# Patient Record
Sex: MALE | Race: WHITE | NOT HISPANIC OR LATINO | URBAN - NONMETROPOLITAN AREA
[De-identification: names, ages, dates, MRNs, and addresses within clinical notes are randomized per-mention and may not be internally consistent; named-entity substitution may affect disease eponyms.]

---

## 2018-01-30 PROBLEM — H18.422: Noted: 2018-01-30

## 2018-01-30 PROBLEM — E11.3393: Noted: 2018-01-30

## 2018-01-30 PROBLEM — Z96.1: Noted: 2018-01-30

## 2018-01-30 PROBLEM — H40.1132: Noted: 2018-01-30

## 2018-01-30 PROBLEM — H44.412: Noted: 2018-01-30

## 2018-01-30 PROBLEM — H16.223: Noted: 2018-01-30

## 2019-03-28 ENCOUNTER — IMPORTED ENCOUNTER (OUTPATIENT)
Dept: URBAN - NONMETROPOLITAN AREA CLINIC 1 | Facility: CLINIC | Age: 69
End: 2019-03-28

## 2019-03-28 PROCEDURE — 92014 COMPRE OPH EXAM EST PT 1/>: CPT

## 2019-03-28 PROCEDURE — 92133 CPTRZD OPH DX IMG PST SGM ON: CPT

## 2019-03-28 NOTE — PATIENT DISCUSSION
ENRIKE OD-  Discussed findings in detail w/ pt today-  Signs/symptoms associated discussed including blurry vision. -  Continue Systane Balance OD BID -  Continue to monitor POAG w/ hx of narrow angles:-  Educated patient on condition today. -  Patent PI noted today. -  OCT done today shows No NFL thinning OD and OS UTO -  Optos done previously stable findings OU. -  Gonio done previously grade IV with light pigment OU. -  VF done previously OD unreliable with superior and inferior arcuate with central scotoma. -  Steroid reponder previously. -  Patient had iStent placed at time of cataract surgery. -  IOP stable today at 15 OD.-  PACHY done previously showed -  Continue to monitor  Type II DM dx 25+ years ago:  -  Educated patient on condition today. -  Patient is followed by Dr. Loida Feng as well but has since been released from his care for injections  -  No DME seen on exam today doing much better. -  Mild BDR seen on last visit but stable from previous. -  Explained importance of good blood sugar control and how it can affect the vision. Patient continues to complain about GLRx every visit. -  Reminded again of importance of good blood sugar control. -  Continue to monitor closely. Dermatochalasis OU Ptosis OD-  Discussed findings in detail w/ pt today-  Signs/symptoms associated discussed-  I feel this may be the cause of some of his VA decrease. When we lift the eyelid up in the office today he states he definitely sees better. -  Will continue to monitor for now. P/C IOL OD:-  Educated patient on condition today. -  Appears stable and doing well. -  Continue to monitor. Band Keratopathy OS -  Discussed diagnosis in detail with patient-  Patient is stable at this time-  Continue to monitorHx of Keratitis OD- Discussed diagnosis in detail with patient- No findings on exam today- Continue to monitorHx of Hypotony OS-  discussed finding w/patient-  signs/symptoms associated discussed-  stable on exam today.  -  montor PRNHyperopia/astig/presby-  discussed refractive status-  monitor PRN; 's Notes: RM  6/13/2018Optos 6/13/2018OCT disc 3/28/19OCT mac 1/30/18VF  1/30/17Gonio  6/14/18PACH  10/20/15 Adam Reyes

## 2019-06-27 ENCOUNTER — IMPORTED ENCOUNTER (OUTPATIENT)
Dept: URBAN - NONMETROPOLITAN AREA CLINIC 1 | Facility: CLINIC | Age: 69
End: 2019-06-27

## 2019-06-27 PROBLEM — H18.422: Noted: 2019-06-27

## 2019-06-27 PROBLEM — H40.1132: Noted: 2019-06-27

## 2019-06-27 PROBLEM — E11.3393: Noted: 2019-06-27

## 2019-06-27 PROBLEM — Z96.1: Noted: 2019-06-27

## 2019-06-27 PROBLEM — H44.412: Noted: 2019-06-27

## 2019-06-27 PROBLEM — H16.223: Noted: 2019-06-27

## 2019-06-27 PROBLEM — H10.11: Noted: 2019-06-27

## 2019-06-27 PROCEDURE — 99213 OFFICE O/P EST LOW 20 MIN: CPT

## 2019-06-27 NOTE — PATIENT DISCUSSION
Conjunctivitis OD - Discussed diagnosis in detail with patient - D/C Polymyxin that was Rx'ed by Urgent Care- Start Alrex TID OD samples given today - Continue to monitor - RTC 2 weeks F/U POAG with VF and Complete -------------------------previous notes---------------------------ENRIKE OD-  Discussed findings in detail w/ pt today-  Signs/symptoms associated discussed including blurry vision. -  Continue Systane Balance OD BID -  Continue to monitor POAG w/ hx of narrow angles:-  Educated patient on condition today. -  Patent PI noted today. -  OCT done today shows No NFL thinning OD and OS UTO -  Optos done previously stable findings OU. -  Gonio done previously grade IV with light pigment OU. -  VF done previously OD unreliable with superior and inferior arcuate with central scotoma. -  Steroid reponder previously. -  Patient had iStent placed at time of cataract surgery. -  IOP stable today at 15 OD.-  PACHY done previously showed -  Continue to monitor  Type II DM dx 25+ years ago:  -  Educated patient on condition today. -  Patient is followed by Dr. Hoda Lazcano as well but has since been released from his care for injections  -  No DME seen on exam today doing much better. -  Mild BDR seen on last visit but stable from previous. -  Explained importance of good blood sugar control and how it can affect the vision. Patient continues to complain about GLRx every visit. -  Reminded again of importance of good blood sugar control. -  Continue to monitor closely. Dermatochalasis OU Ptosis OD-  Discussed findings in detail w/ pt today-  Signs/symptoms associated discussed-  I feel this may be the cause of some of his VA decrease. When we lift the eyelid up in the office today he states he definitely sees better. -  Will continue to monitor for now. P/C IOL OD:-  Educated patient on condition today. -  Appears stable and doing well. -  Continue to monitor. Band Keratopathy OS -  Discussed diagnosis in detail with patient-  Patient is stable at this time-  Continue to monitorHx of Keratitis OD- Discussed diagnosis in detail with patient- No findings on exam today- Continue to monitorHx of Hypotony OS-  discussed finding w/patient-  signs/symptoms associated discussed-  stable on exam today.  -  montor PRNHyperopia/astig/presby-  discussed refractive status-  monitor PRN; 's Notes: RM  6/13/2018Optos 6/13/2018OCT disc 3/28/19OCT mac 1/30/18VF  1/30/17Gonio  6/14/18PACH  10/20/15 Jeet Horan

## 2019-07-15 ENCOUNTER — IMPORTED ENCOUNTER (OUTPATIENT)
Dept: URBAN - NONMETROPOLITAN AREA CLINIC 1 | Facility: CLINIC | Age: 69
End: 2019-07-15

## 2019-07-15 PROBLEM — H18.422: Noted: 2019-07-15

## 2019-07-15 PROBLEM — E11.3393: Noted: 2019-07-15

## 2019-07-15 PROBLEM — H44.412: Noted: 2019-07-15

## 2019-07-15 PROBLEM — H40.1132: Noted: 2019-07-15

## 2019-07-15 PROBLEM — H16.223: Noted: 2019-07-15

## 2019-07-15 PROBLEM — Z96.1: Noted: 2019-07-15

## 2019-07-15 PROCEDURE — 92083 EXTENDED VISUAL FIELD XM: CPT

## 2019-07-15 PROCEDURE — 92014 COMPRE OPH EXAM EST PT 1/>: CPT

## 2019-07-15 PROCEDURE — 92015 DETERMINE REFRACTIVE STATE: CPT

## 2019-07-15 NOTE — PATIENT DISCUSSION
Conjunctivitis OD - Discussed diagnosis in detail with patient - D/C Polymyxin that was Rx'ed by Urgent Care- Continue Alrex BID OD until gone  - Continue to monitor  POAG w/ hx of narrow angles:-  Discussed diagnosis in detail with patient  -  Patient had iStent placed at time of cataract surgery. -  Patent PI noted today. -  Steroid reponder previously. -  OCT done previously shows No NFL thinning OD and OS UTO -  Optos done previously stable findings OU. -  Gonio done previously grade IV with light pigment OU. -  PACHY done previously showed -  VF done today OD shows Superior arcuate -  IOP today OD 11 -  Cup to Disc noted at OD . 9-  Start Latanoprost QHS OD  -  Continue to monitor  -  RTC 3 weeks F/U  Type II DM dx 25+ years ago:  -  Discussed diagnosis in detail with patient  -  Patient is followed by Dr. Hoda Lazcano as well but has since been released from his care for injections  -  No DME seen on exam today doing much better. -  Mild BDR seen on last visit but stable from previous. -  Explained importance of good blood sugar control and how it can affect the vision. -  Reminded again of importance of good blood sugar control. -  Recommend no sodas -  Continue to monitor Dermatochalasis OU Ptosis OD-  Discussed findings in detail w/ pt today-  Signs/symptoms associated discussed-  I feel this may be the cause of some of his VA decrease. When we lift the eyelid up in the office he states he definitely sees better. -  Will continue to monitor for now. P/C IOL OD:-  Educated patient on condition today. -  Appears stable and doing well. -  Continue to monitor.  Band Keratopathy OS -  Discussed diagnosis in detail with patient-  Patient is stable at this time-  Continue to monitorDES OD- Discussed diagnosis in detail with patient- Discussed signs and symptoms of progression- Recommend patient drinking plenty of water and starting Omega 3’s - Continue to monitorHx of Keratitis OD- Discussed diagnosis in detail with patient- No findings on exam today- Continue to monitorHx of Hypotony OS- Discussed diagnosis in detail with patient- Signs/symptoms associated discussed- Continue to monitorPresbyopia OU - Discussed diagnosis in detail with patient- New glasses Rx given today - Continue to monitor PRN; Dr's Notes: RM  6/13/2018Optos 6/13/2018OCT disc 3/28/19OCT mac 1/30/18VF 7/15/19Gonio  6/14/18PACH  10/20/15 - Yara Almaguer

## 2019-07-22 ENCOUNTER — IMPORTED ENCOUNTER (OUTPATIENT)
Dept: URBAN - NONMETROPOLITAN AREA CLINIC 1 | Facility: CLINIC | Age: 69
End: 2019-07-22

## 2019-07-22 PROBLEM — H11.31: Noted: 2019-07-22

## 2019-07-22 PROBLEM — H18.422: Noted: 2019-07-22

## 2019-07-22 PROBLEM — E11.3393: Noted: 2019-07-22

## 2019-07-22 PROBLEM — H44.412: Noted: 2019-07-22

## 2019-07-22 PROBLEM — H16.223: Noted: 2019-07-22

## 2019-07-22 PROBLEM — Z96.1: Noted: 2019-07-22

## 2019-07-22 PROBLEM — S05.01XA: Noted: 2020-10-01

## 2019-07-22 PROBLEM — H40.1132: Noted: 2019-07-22

## 2019-07-22 PROCEDURE — 99213 OFFICE O/P EST LOW 20 MIN: CPT

## 2019-07-22 NOTE — PATIENT DISCUSSION
Subconjunctival Hemorrhage OD - Discussed diagnosis in detail with patient - Recommend cool compresses 10-15 mins on and 45 mins off- Samples of Refresh Optive given today to use through out the day- Recommend keeping head elevated - D/C Alrex as of today- Continue to monitor - RTC A/S ---------------------------------previous notes--------------------------Conjunctivitis OD - Discussed diagnosis in detail with patient - D/C Polymyxin that was Rx'ed by Urgent Care- Continue Alrex BID OD until gone  - Continue to monitor  POAG w/ hx of narrow angles:-  Discussed diagnosis in detail with patient  -  Patient had iStent placed at time of cataract surgery. -  Patent PI noted today. -  Steroid reponder previously. -  OCT done previously shows No NFL thinning OD and OS UTO -  Optos done previously stable findings OU. -  Gonio done previously grade IV with light pigment OU. -  PACHY done previously showed -  VF done today OD shows Superior arcuate -  IOP today OD 11 -  Cup to Disc noted at OD . 9-  Start Latanoprost QHS OD  -  Continue to monitor  -  RTC 3 weeks F/U  Type II DM dx 25+ years ago:  -  Discussed diagnosis in detail with patient  -  Patient is followed by Dr. Amina Orellana as well but has since been released from his care for injections  -  No DME seen on exam today doing much better. -  Mild BDR seen on last visit but stable from previous. -  Explained importance of good blood sugar control and how it can affect the vision. -  Reminded again of importance of good blood sugar control. -  Recommend no sodas -  Continue to monitor Dermatochalasis OU Ptosis OD-  Discussed findings in detail w/ pt today-  Signs/symptoms associated discussed-  I feel this may be the cause of some of his VA decrease. When we lift the eyelid up in the office he states he definitely sees better. -  Will continue to monitor for now. P/C IOL OD:-  Educated patient on condition today. -  Appears stable and doing well. -  Continue to monitor.  Band Keratopathy OS -  Discussed diagnosis in detail with patient-  Patient is stable at this time-  Continue to monitorDES OD- Discussed diagnosis in detail with patient- Discussed signs and symptoms of progression- Recommend patient drinking plenty of water and starting Omega 3’s - Continue to monitorHx of Keratitis OD- Discussed diagnosis in detail with patient- No findings on exam today- Continue to monitorHx of Hypotony OS- Discussed diagnosis in detail with patient- Signs/symptoms associated discussed- Continue to monitorPresbyopia OU - Discussed diagnosis in detail with patient- New glasses Rx given today - Continue to monitor PRN; 's Notes: RM  6/13/2018Optos 6/13/2018OCT disc 3/28/19OCT mac 1/30/18VF 7/15/19Gonio  6/14/18PACH  10/20/15 - Skip Chandra

## 2019-07-26 ENCOUNTER — IMPORTED ENCOUNTER (OUTPATIENT)
Dept: URBAN - NONMETROPOLITAN AREA CLINIC 1 | Facility: CLINIC | Age: 69
End: 2019-07-26

## 2019-07-26 PROCEDURE — 92012 INTRM OPH EXAM EST PATIENT: CPT

## 2019-07-26 NOTE — PATIENT DISCUSSION
Subconjunctival Hemorrhage OD - Discussed diagnosis in detail with patient - Continue cool compresses 10-15 mins on and 45 mins off- Recommend cool Refresh Optive to use through out the day (keep in the refrigerator)- Recommend keeping head elevated - Continue to monitor - RTC A/S ---------------------------------previous notes--------------------------Conjunctivitis OD - Discussed diagnosis in detail with patient - D/C Polymyxin that was Rx'ed by Urgent Care- Continue Alrex BID OD until gone  - Continue to monitor  POAG w/ hx of narrow angles:-  Discussed diagnosis in detail with patient  -  Patient had iStent placed at time of cataract surgery. -  Patent PI noted today. -  Steroid reponder previously. -  OCT done previously shows No NFL thinning OD and OS UTO -  Optos done previously stable findings OU. -  Gonio done previously grade IV with light pigment OU. -  PACHY done previously showed -  VF done today OD shows Superior arcuate -  IOP today OD 11 -  Cup to Disc noted at OD . 9-  Start Latanoprost QHS OD  -  Continue to monitor  -  RTC 3 weeks F/U  Type II DM dx 25+ years ago:  -  Discussed diagnosis in detail with patient  -  Patient is followed by Dr. Brit Barahona as well but has since been released from his care for injections  -  No DME seen on exam today doing much better. -  Mild BDR seen on last visit but stable from previous. -  Explained importance of good blood sugar control and how it can affect the vision. -  Reminded again of importance of good blood sugar control. -  Recommend no sodas -  Continue to monitor Dermatochalasis OU Ptosis OD-  Discussed findings in detail w/ pt today-  Signs/symptoms associated discussed-  I feel this may be the cause of some of his VA decrease. When we lift the eyelid up in the office he states he definitely sees better. -  Will continue to monitor for now. P/C IOL OD:-  Educated patient on condition today. -  Appears stable and doing well. -  Continue to monitor.  Band Keratopathy OS -  Discussed diagnosis in detail with patient-  Patient is stable at this time-  Continue to monitorDES OD- Discussed diagnosis in detail with patient- Discussed signs and symptoms of progression- Recommend patient drinking plenty of water and starting Omega 3’s - Continue to monitorHx of Keratitis OD- Discussed diagnosis in detail with patient- No findings on exam today- Continue to monitorHx of Hypotony OS- Discussed diagnosis in detail with patient- Signs/symptoms associated discussed- Continue to monitorPresbyopia OU - Discussed diagnosis in detail with patient- New glasses Rx given today - Continue to monitor PRN; 's Notes: MR  6/13/2018Optos 6/13/2018OCT disc 3/28/19OCT mac 1/30/18VF 7/15/19Gonio  6/14/18PACH  10/20/15 - Geraldine Walls

## 2020-02-14 ENCOUNTER — IMPORTED ENCOUNTER (OUTPATIENT)
Dept: URBAN - NONMETROPOLITAN AREA CLINIC 1 | Facility: CLINIC | Age: 70
End: 2020-02-14

## 2020-02-14 PROCEDURE — 92012 INTRM OPH EXAM EST PATIENT: CPT

## 2020-02-14 NOTE — PATIENT DISCUSSION
POAG w/ hx of narrow angles:-  Discussed diagnosis in detail with patient  -  Patient had iStent placed at time of cataract surgery. -  Patent PI noted today. -  Steroid reponder previously. -  OCT done previously shows No NFL thinning OD and OS UTO -  Optos done previously stable findings OU. -  Gonio done previously grade IV with light pigment OU. -  PACHY done previously showed -  VF done previously OD shows Superior arcuate -  IOP today OD 14-  Cup to Disc noted at OD . 9-  Continue Latanoprost QHS OD  -  Continue to monitor  -  RTC in 6 months with VF 24-2 and OCT (OD ONLY)Type II DM dx 25+ years ago:  -  Discussed diagnosis in detail with patient  -  Patient is followed by Dr. Krys Gonzalez as well but has since been released from his care for injections  -  No DME seen on exam today doing much better. -  Mild BDR seen on last visit but stable from previous. -  Explained importance of good blood sugar control and how it can affect the vision. -  Reminded again of importance of good blood sugar control. -  Recommend no sodas -  Continue to monitor Dermatochalasis OU Ptosis OD-  Discussed findings in detail w/ pt today-  Signs/symptoms associated discussed-  I feel this may be the cause of some of his VA decrease. When we lift the eyelid up in the office he states he definitely sees better. -  Will continue to monitor for now. P/C IOL OD:-  Educated patient on condition today. -  Appears stable and doing well. -  Continue to monitor.  Band Keratopathy OS -  Discussed diagnosis in detail with patient-  Patient is stable at this time-  Continue to monitorDES OD- Discussed diagnosis in detail with patient- Discussed signs and symptoms of progression- Recommend patient drinking plenty of water and starting Omega 3’s - Continue to monitorHx of Keratitis OD- Discussed diagnosis in detail with patient- No findings on exam today- Continue to monitorHx of Hypotony OS- Discussed diagnosis in detail with patient- Signs/symptoms associated discussed- Continue to monitorPresbyopia OU - Discussed diagnosis in detail with patient- Continue to monitor PRN; 's Notes: MR  6/13/2018Optos 6/13/2018OCT disc 3/28/19OCT mac 1/30/18VF 7/15/19Gonio  6/14/18PACH  10/20/15 - Barbie Gloss

## 2020-10-01 ENCOUNTER — IMPORTED ENCOUNTER (OUTPATIENT)
Dept: URBAN - NONMETROPOLITAN AREA CLINIC 1 | Facility: CLINIC | Age: 70
End: 2020-10-01

## 2020-10-01 PROCEDURE — 92071 CONTACT LENS FITTING FOR TX: CPT

## 2020-10-01 PROCEDURE — 99213 OFFICE O/P EST LOW 20 MIN: CPT

## 2020-10-01 NOTE — PATIENT DISCUSSION
Abrasion OD - Discussed diagnosis in detail with patient - Abrasion noted today OD - BCL inserted today B& L Ultra 8.5 / -0.50 / LOT [id number] / exp date 09/01/2022- Continue to monitor - RTC tomorrow for BCL removal -------------------------previous notes----------------------------POAG w/ hx of narrow angles:-  Discussed diagnosis in detail with patient  -  Patient had iStent placed at time of cataract surgery. -  Patent PI noted today. -  Steroid reponder previously. -  OCT done previously shows No NFL thinning OD and OS UTO -  Optos done previously stable findings OU. -  Gonio done previously grade IV with light pigment OU. -  PACHY done previously showed -  VF done previously OD shows Superior arcuate -  IOP today OD 14-  Cup to Disc noted at OD . 9-  Continue Latanoprost QHS OD  -  Continue to monitor  -  RTC in 6 months with VF 24-2 and OCT (OD ONLY)Type II DM dx 25+ years ago:  -  Discussed diagnosis in detail with patient  -  Patient is followed by Dr. Pati Roberson as well but has since been released from his care for injections  -  No DME seen on exam today doing much better. -  Mild BDR seen on last visit but stable from previous. -  Explained importance of good blood sugar control and how it can affect the vision. -  Reminded again of importance of good blood sugar control. -  Recommend no sodas -  Continue to monitor Dermatochalasis OU Ptosis OD-  Discussed findings in detail w/ pt today-  Signs/symptoms associated discussed-  I feel this may be the cause of some of his VA decrease. When we lift the eyelid up in the office he states he definitely sees better. -  Will continue to monitor for now. P/C IOL OD:-  Educated patient on condition today. -  Appears stable and doing well. -  Continue to monitor.  Band Keratopathy OS -  Discussed diagnosis in detail with patient-  Patient is stable at this time-  Continue to monitorDES OD- Discussed diagnosis in detail with patient- Discussed signs and symptoms of progression- Recommend patient drinking plenty of water and starting Omega 3’s - Continue to monitorHx of Keratitis OD- Discussed diagnosis in detail with patient- No findings on exam today- Continue to monitorHx of Hypotony OS- Discussed diagnosis in detail with patient- Signs/symptoms associated discussed- Continue to monitorPresbyopia OU - Discussed diagnosis in detail with patient- Continue to monitor PRN; 's Notes: MR  6/13/2018Optos 6/13/2018OCT disc 3/28/19OCT mac 1/30/18VF 7/15/19Gonio  6/14/18PACH  10/20/15 - Davy Rodriguez

## 2020-10-02 ENCOUNTER — IMPORTED ENCOUNTER (OUTPATIENT)
Dept: URBAN - NONMETROPOLITAN AREA CLINIC 1 | Facility: CLINIC | Age: 70
End: 2020-10-02

## 2020-10-02 PROCEDURE — 99213 OFFICE O/P EST LOW 20 MIN: CPT

## 2020-10-02 NOTE — PATIENT DISCUSSION
Abrasion OD Resolved- Discussed diagnosis in detail with patient - Abrasion noted today OD - BCL removed today- Samples of Refresh Relieva given today- STRESSED no rubbing eye - Continue to monitor  -------------------------previous notes----------------------------POAG w/ hx of narrow angles:-  Discussed diagnosis in detail with patient  -  Patient had iStent placed at time of cataract surgery. -  Patent PI noted today. -  Steroid reponder previously. -  OCT done previously shows No NFL thinning OD and OS UTO -  Optos done previously stable findings OU. -  Gonio done previously grade IV with light pigment OU. -  PACHY done previously showed -  VF done previously OD shows Superior arcuate -  IOP today OD 14-  Cup to Disc noted at OD . 9-  Continue Latanoprost QHS OD  -  Continue to monitor  -  RTC in 6 months with VF 24-2 and OCT (OD ONLY)Type II DM dx 25+ years ago:  -  Discussed diagnosis in detail with patient  -  Patient is followed by Dr. Jeff Upton as well but has since been released from his care for injections  -  No DME seen on exam today doing much better. -  Mild BDR seen on last visit but stable from previous. -  Explained importance of good blood sugar control and how it can affect the vision. -  Reminded again of importance of good blood sugar control. -  Recommend no sodas -  Continue to monitor Dermatochalasis OU Ptosis OD-  Discussed findings in detail w/ pt today-  Signs/symptoms associated discussed-  I feel this may be the cause of some of his VA decrease. When we lift the eyelid up in the office he states he definitely sees better. -  Will continue to monitor for now. P/C IOL OD:-  Educated patient on condition today. -  Appears stable and doing well. -  Continue to monitor.  Band Keratopathy OS -  Discussed diagnosis in detail with patient-  Patient is stable at this time-  Continue to monitorDES OD- Discussed diagnosis in detail with patient- Discussed signs and symptoms of progression- Recommend patient drinking plenty of water and starting Omega 3’s - Continue to monitorHx of Keratitis OD- Discussed diagnosis in detail with patient- No findings on exam today- Continue to monitorHx of Hypotony OS- Discussed diagnosis in detail with patient- Signs/symptoms associated discussed- Continue to monitorPresbyopia OU - Discussed diagnosis in detail with patient- Continue to monitor PRN; 's Notes: MR  6/13/2018Optos 6/13/2018OCT disc 3/28/19OCT mac 1/30/18VF 7/15/19Gonio  6/14/18PACH  10/20/15 - lEizabeth Lee

## 2020-11-02 ENCOUNTER — IMPORTED ENCOUNTER (OUTPATIENT)
Dept: URBAN - NONMETROPOLITAN AREA CLINIC 1 | Facility: CLINIC | Age: 70
End: 2020-11-02

## 2020-11-02 PROBLEM — E11.3393: Noted: 2020-11-02

## 2020-11-02 PROBLEM — H40.1132: Noted: 2020-11-02

## 2020-11-02 PROBLEM — H02.831: Noted: 2020-11-02

## 2020-11-02 PROBLEM — H16.223: Noted: 2020-11-02

## 2020-11-02 PROBLEM — H44.412: Noted: 2020-11-02

## 2020-11-02 PROBLEM — H02.834: Noted: 2020-11-02

## 2020-11-02 PROBLEM — H18.422: Noted: 2020-11-02

## 2020-11-02 PROCEDURE — 99214 OFFICE O/P EST MOD 30 MIN: CPT

## 2020-11-02 NOTE — PATIENT DISCUSSION
Dermatochalasis OU Ptosis OD-  Discussed findings in detail w/ pt today-  Signs/symptoms associated discussed-  I feel this may be the cause of some of his VA decrease. When we lift the eyelid up in the office he states he definitely sees better. -  Recommend lid eval with Dr. William Romero. Patient agrees with plan -  Will continue to monitor for now. ENRIKE OU- Discussed diagnosis in detail with patient- Discussed signs and symptoms of progression- Recommend patient drinking plenty of water and starting Omega 3’s - Recommend Refresh Relieva through out the day - Samples given today of Refresh Relieva- Continue to monitor-------------------------previous notes----------------------------POAG w/ hx of narrow angles:-  Discussed diagnosis in detail with patient  -  Patient had iStent placed at time of cataract surgery. -  Patent PI noted today. -  Steroid reponder previously. -  OCT done previously shows No NFL thinning OD and OS UTO -  Optos done previously stable findings OU. -  Gonio done previously grade IV with light pigment OU. -  PACHY done previously showed -  VF done previously OD shows Superior arcuate -  IOP today OD 14-  Cup to Disc noted at OD . 9-  Continue Latanoprost QHS OD  -  Continue to monitor  -  RTC in 6 months with VF 24-2 and OCT (OD ONLY)Type II DM dx 25+ years ago:  -  Discussed diagnosis in detail with patient  -  Patient is followed by Dr. Krys Gonzalez as well but has since been released from his care for injections  -  No DME seen on exam today doing much better. -  Mild BDR seen on last visit but stable from previous. -  Explained importance of good blood sugar control and how it can affect the vision. -  Reminded again of importance of good blood sugar control. -  Recommend no sodas -  Continue to monitor P/C IOL OD:-  Educated patient on condition today. -  Appears stable and doing well. -  Continue to monitor.  Band Keratopathy OS -  Discussed diagnosis in detail with patient-  Patient is stable at this time-  Continue to monitorHx of Keratitis OD- Discussed diagnosis in detail with patient- No findings on exam today- Continue to monitorHx of Hypotony OS- Discussed diagnosis in detail with patient- Signs/symptoms associated discussed- Continue to monitorPresbyopia OU - Discussed diagnosis in detail with patient- Continue to monitor PRN; 's Notes: MR  6/13/2018Optos 6/13/2018OCT disc 3/28/19OCT mac 1/30/18VF 7/15/19Gonio  6/14/18PACH  10/20/15 - Barbie Infante

## 2020-12-16 ENCOUNTER — IMPORTED ENCOUNTER (OUTPATIENT)
Dept: URBAN - NONMETROPOLITAN AREA CLINIC 1 | Facility: CLINIC | Age: 70
End: 2020-12-16

## 2020-12-16 PROBLEM — H44.412: Noted: 2020-11-02

## 2020-12-16 PROBLEM — H18.422: Noted: 2020-11-02

## 2020-12-16 PROBLEM — E11.3393: Noted: 2020-11-02

## 2020-12-16 PROBLEM — H02.413: Noted: 2020-12-16

## 2020-12-16 PROBLEM — H40.1132: Noted: 2020-11-02

## 2020-12-16 PROBLEM — H16.223: Noted: 2020-12-16

## 2020-12-16 PROCEDURE — 92012 INTRM OPH EXAM EST PATIENT: CPT

## 2020-12-16 PROCEDURE — 92285 EXTERNAL OCULAR PHOTOGRAPHY: CPT

## 2020-12-16 NOTE — PATIENT DISCUSSION
"Ptosis -Ptosis (the upper eyelid being in a lower than normal position) of the upper eyelid was explained to the patient. -RBAs of ptosis repair discussed with patient. Treatment options include observation or surgical correction.-Discussed that if i lift RUL  he may regain some peripheal vision but since OS is so damagned and he has no vision he will not gain any vision. I do not recommend lifting YAHIR and causes his cornea to ulcerate and cause any more damage. Discussed lifting RUL only if patient desires but discussed the cosmetic look he will have. Again expressed to patient lifting the RUL will not help him with vision but he expresses if he lifts his lid he feels he ses much better. After long discussion we decided to proceed with Ptosis RUL and skin excision. Explained to patient it will feel tight afterpost opDiscussed stopping ASA 81 mg x 3 weeks. Valium 5 mg RX given @ eval day by [facility] 1 ODBLF ODTBUTTask sent to [facility] Dermatochalasis OU Ptosis OD-  Discussed findings in detail w/ pt today-  Signs/symptoms associated discussed-  I feel this may be the cause of some of his VA decrease. When we lift the eyelid up in the office he states he definitely sees better. -  Recommend lid eval with Dr. William Romero. Patient agrees with plan -  Will continue to monitor for now. ENRIKE OU- Discussed diagnosis in detail with patient- Discussed signs and symptoms of progression- Recommend patient drinking plenty of water and starting Omega 3’s - Recommend Refresh Relieva through out the day - Samples given today of Refresh Relieva- Continue to monitor-------------------------previous notes----------------------------POAG w/ hx of narrow angles:-  Discussed diagnosis in detail with patient  -  Patient had iStent placed at time of cataract surgery. -  Patent PI noted today. -  Steroid reponder previously. -  OCT done previously shows No NFL thinning OD and OS UTO -  Optos done previously stable findings OU. -  Gonio done previously grade IV with light pigment OU. -  PACHY done previously showed -  VF done previously OD shows Superior arcuate -  IOP today OD 14-  Cup to Disc noted at OD . 9-  Continue Latanoprost QHS OD  -  Continue to monitor  -  RTC in 6 months with VF 24-2 and OCT (OD ONLY)Type II DM dx 25+ years ago:  -  Discussed diagnosis in detail with patient  -  Patient is followed by Dr. Krys Gonzalez as well but has since been released from his care for injections  -  No DME seen on exam today doing much better. -  Mild BDR seen on last visit but stable from previous. -  Explained importance of good blood sugar control and how it can affect the vision. -  Reminded again of importance of good blood sugar control. -  Recommend no sodas -  Continue to monitor P/C IOL OD:-  Educated patient on condition today. -  Appears stable and doing well. -  Continue to monitor.  Band Keratopathy OS -  Discussed diagnosis in detail with patient-  Patient is stable at this time-  Continue to monitorHx of Keratitis OD- Discussed diagnosis in detail with patient- No findings on exam today- Continue to monitorHx of Hypotony OS- Discussed diagnosis in detail with patient- Signs/symptoms associated discussed- Continue to monitorPresbyopia OU - Discussed diagnosis in detail with patient- Continue to monitor PRN ""; 's Notes: MR  6/13/2018Optos 6/13/2018OCT disc 3/28/19OCT mac 1/30/18VF 7/15/19Gonio  6/14/18PACH  10/20/15 - Nicolas"

## 2020-12-28 ENCOUNTER — IMPORTED ENCOUNTER (OUTPATIENT)
Dept: URBAN - NONMETROPOLITAN AREA CLINIC 1 | Facility: CLINIC | Age: 70
End: 2020-12-28

## 2020-12-28 NOTE — PATIENT DISCUSSION
"Ptosis -Ptosis (the upper eyelid being in a lower than normal position) of the upper eyelid was explained to the patient. -RBAs of ptosis repair discussed with patient. Treatment options include observation or surgical correction.-Discussed that if i lift RUL  he may regain some peripheal vision but since OS is so damagned and he has no vision he will not gain any vision. I do not recommend lifting YAHIR and causes his cornea to ulcerate and cause any more damage. Discussed lifting RUL only if patient desires but discussed the cosmetic look he will have. Again expressed to patient lifting the RUL will not help him with vision but he expresses if he lifts his lid he feels he ses much better. After long discussion we decided to proceed with Ptosis RUL and skin excision. Explained to patient it will feel tight afterpost opDiscussed stopping ASA 81 mg x 3 weeks. Valium 5 mg RX given @ eval day by [facility] MRD 1 ODBLF ODTBUTTask sent to [facility] Dermatochalasis OU Ptosis OD-  Discussed findings in detail w/ pt today-  Signs/symptoms associated discussed-  I feel this may be the cause of some of his VA decrease. When we lift the eyelid up in the office he states he definitely sees better. -  Recommend lid eval with Dr. Alpa Gramajo. Patient agrees with plan -  Will continue to monitor for now. ENRIKE OU- Discussed diagnosis in detail with patient- Discussed signs and symptoms of progression- Recommend patient drinking plenty of water and starting Omega 3’s - Recommend Refresh Relieva through out the day - Samples given today of Refresh Relieva- Continue to monitor-------------------------previous notes----------------------------POAG w/ hx of narrow angles:-  Discussed diagnosis in detail with patient  -  Patient had iStent placed at time of cataract surgery. -  Patent PI noted today. -  Steroid reponder previously. -  OCT done previously shows No NFL thinning OD and OS UTO -  Optos done previously stable findings OU. -  Gonio done previously grade IV with light pigment OU. -  PACHY done previously showed -  VF done previously OD shows Superior arcuate -  IOP today OD 14-  Cup to Disc noted at OD . 9-  Continue Latanoprost QHS OD  -  Continue to monitor  -  RTC in 6 months with VF 24-2 and OCT (OD ONLY)Type II DM dx 25+ years ago:  -  Discussed diagnosis in detail with patient  -  Patient is followed by Dr. Lili Arce as well but has since been released from his care for injections  -  No DME seen on exam today doing much better. -  Mild BDR seen on last visit but stable from previous. -  Explained importance of good blood sugar control and how it can affect the vision. -  Reminded again of importance of good blood sugar control. -  Recommend no sodas -  Continue to monitor P/C IOL OD:-  Educated patient on condition today. -  Appears stable and doing well. -  Continue to monitor.  Band Keratopathy OS -  Discussed diagnosis in detail with patient-  Patient is stable at this time-  Continue to monitorHx of Keratitis OD- Discussed diagnosis in detail with patient- No findings on exam today- Continue to monitorHx of Hypotony OS- Discussed diagnosis in detail with patient- Signs/symptoms associated discussed- Continue to monitorPresbyopia OU - Discussed diagnosis in detail with patient- Continue to monitor PRN ""; 's Notes: MR  6/13/2018Optos 6/13/2018OCT disc 3/28/19OCT mac 1/30/18VF 7/15/19Gonio  6/14/18PACH  10/20/15 - Nicolas"

## 2021-05-12 ENCOUNTER — IMPORTED ENCOUNTER (OUTPATIENT)
Dept: URBAN - NONMETROPOLITAN AREA CLINIC 1 | Facility: CLINIC | Age: 71
End: 2021-05-12

## 2021-05-12 PROCEDURE — 67904 REPAIR EYELID DEFECT: CPT

## 2021-05-12 NOTE — PATIENT DISCUSSION
"Ptosis -Ptosis (the upper eyelid being in a lower than normal position) of the upper eyelid was explained to the patient. -RBAs of ptosis repair discussed with patient. Treatment options include observation or surgical correction.-Discussed that if i lift RUL  he may regain some peripheal vision but since OS is so damagned and he has no vision he will not gain any vision. I do not recommend lifting YAHIR and causes his cornea to ulcerate and cause any more damage. Discussed lifting RUL only if patient desires but discussed the cosmetic look he will have. Again expressed to patient lifting the RUL will not help him with vision but he expresses if he lifts his lid he feels he ses much better. After long discussion we decided to proceed with Ptosis RUL and skin excision. Explained to patient it will feel tight afterpost opDiscussed stopping ASA 81 mg x 3 weeks. Valium 5 mg RX given @ eval day by [facility] MRD 1 ODBLF ODTBUTTask sent to [facility] Dermatochalasis OU Ptosis OD-  Discussed findings in detail w/ pt today-  Signs/symptoms associated discussed-  I feel this may be the cause of some of his VA decrease. When we lift the eyelid up in the office he states he definitely sees better. -  Recommend lid eval with Dr. Lalitha Franco. Patient agrees with plan -  Will continue to monitor for now. ENRIKE OU- Discussed diagnosis in detail with patient- Discussed signs and symptoms of progression- Recommend patient drinking plenty of water and starting Omega 3’s - Recommend Refresh Relieva through out the day - Samples given today of Refresh Relieva- Continue to monitor-------------------------previous notes----------------------------POAG w/ hx of narrow angles:-  Discussed diagnosis in detail with patient  -  Patient had iStent placed at time of cataract surgery. -  Patent PI noted today. -  Steroid reponder previously. -  OCT done previously shows No NFL thinning OD and OS UTO -  Optos done previously stable findings OU. -  Gonio done previously grade IV with light pigment OU. -  PACHY done previously showed -  VF done previously OD shows Superior arcuate -  IOP today OD 14-  Cup to Disc noted at OD . 9-  Continue Latanoprost QHS OD  -  Continue to monitor  -  RTC in 6 months with VF 24-2 and OCT (OD ONLY)Type II DM dx 25+ years ago:  -  Discussed diagnosis in detail with patient  -  Patient is followed by Dr. Loida Feng as well but has since been released from his care for injections  -  No DME seen on exam today doing much better. -  Mild BDR seen on last visit but stable from previous. -  Explained importance of good blood sugar control and how it can affect the vision. -  Reminded again of importance of good blood sugar control. -  Recommend no sodas -  Continue to monitor P/C IOL OD:-  Educated patient on condition today. -  Appears stable and doing well. -  Continue to monitor.  Band Keratopathy OS -  Discussed diagnosis in detail with patient-  Patient is stable at this time-  Continue to monitorHx of Keratitis OD- Discussed diagnosis in detail with patient- No findings on exam today- Continue to monitorHx of Hypotony OS- Discussed diagnosis in detail with patient- Signs/symptoms associated discussed- Continue to monitorPresbyopia OU - Discussed diagnosis in detail with patient- Continue to monitor PRN ""; 's Notes: MR  6/13/2018Optos 6/13/2018OCT disc 3/28/19OCT mac 1/30/18VF 7/15/19Gonio  6/14/18PACH  10/20/15 - Nicolas"

## 2021-05-26 ENCOUNTER — IMPORTED ENCOUNTER (OUTPATIENT)
Dept: URBAN - NONMETROPOLITAN AREA CLINIC 1 | Facility: CLINIC | Age: 71
End: 2021-05-26

## 2021-05-26 PROBLEM — H44.412: Noted: 2021-05-26

## 2021-05-26 PROBLEM — H40.1132: Noted: 2021-05-26

## 2021-05-26 PROBLEM — H18.422: Noted: 2021-05-26

## 2021-05-26 PROBLEM — E11.3393: Noted: 2021-05-26

## 2021-05-26 PROBLEM — H16.223: Noted: 2020-12-16

## 2021-05-26 PROBLEM — Z98.890: Noted: 2021-05-26

## 2021-05-26 PROCEDURE — 99024 POSTOP FOLLOW-UP VISIT: CPT

## 2021-05-26 NOTE — PATIENT DISCUSSION
"2 week S/P Ptosis REpair OD - Discussed diagnosis in detail with patient - Removed sutures removed from OD at slitlamp without complications - Continue ointment for another 2-3 days - Continue to monitor - RTC 2-3 weeks POVwith photos PREVIOUS NOTES ENRIKE OU- Discussed diagnosis in detail with patient- Discussed signs and symptoms of progression- Recommend patient drinking plenty of water and starting Omega 3’s - Recommend Refresh Relieva through out the day - Samples given today of Refresh Relieva- Continue to monitor-------------------------previous notes----------------------------POAG w/ hx of narrow angles:-  Discussed diagnosis in detail with patient  -  Patient had iStent placed at time of cataract surgery. -  Patent PI noted today. -  Steroid reponder previously. -  OCT done previously shows No NFL thinning OD and OS UTO -  Optos done previously stable findings OU. -  Gonio done previously grade IV with light pigment OU. -  PACHY done previously showed -  VF done previously OD shows Superior arcuate -  IOP today OD 14-  Cup to Disc noted at OD . 9-  Continue Latanoprost QHS OD  -  Continue to monitor  -  RTC in 6 months with VF 24-2 and OCT (OD ONLY)Type II DM dx 25+ years ago:  -  Discussed diagnosis in detail with patient  -  Patient is followed by Dr. Krys Gonzalez as well but has since been released from his care for injections  -  No DME seen on exam today doing much better. -  Mild BDR seen on last visit but stable from previous. -  Explained importance of good blood sugar control and how it can affect the vision. -  Reminded again of importance of good blood sugar control. -  Recommend no sodas -  Continue to monitor P/C IOL OD:-  Educated patient on condition today. -  Appears stable and doing well. -  Continue to monitor.  Band Keratopathy OS -  Discussed diagnosis in detail with patient-  Patient is stable at this time-  Continue to monitorHx of Keratitis OD- Discussed diagnosis in detail with patient- No findings on exam today- Continue to monitorHx of Hypotony OS- Discussed diagnosis in detail with patient- Signs/symptoms associated discussed- Continue to monitorPresbyopia OU - Discussed diagnosis in detail with patient- Continue to monitor PRN ""; 's Notes: MR  6/13/2018Optos 6/13/2018OCT disc 3/28/19OCT mac 1/30/18VF 7/15/19Gonio  6/14/18PACH  10/20/15 - Nicolas"

## 2021-06-23 ENCOUNTER — IMPORTED ENCOUNTER (OUTPATIENT)
Dept: URBAN - NONMETROPOLITAN AREA CLINIC 1 | Facility: CLINIC | Age: 71
End: 2021-06-23

## 2021-06-23 PROCEDURE — 99024 POSTOP FOLLOW-UP VISIT: CPT

## 2021-06-23 NOTE — PATIENT DISCUSSION
"4 week S/P Ptosis Repair OD - Discussed diagnosis in detail with patient - Patient is stable and doing well - Continue to monitor - RTC for complete PREVIOUS NOTES ENRIKE OU- Discussed diagnosis in detail with patient- Discussed signs and symptoms of progression- Recommend patient drinking plenty of water and starting Omega 3’s - Recommend Refresh Relieva through out the day - Samples given today of Refresh Relieva- Continue to monitor-------------------------previous notes----------------------------POAG w/ hx of narrow angles:-  Discussed diagnosis in detail with patient  -  Patient had iStent placed at time of cataract surgery. -  Patent PI noted today. -  Steroid reponder previously. -  OCT done previously shows No NFL thinning OD and OS UTO -  Optos done previously stable findings OU. -  Gonio done previously grade IV with light pigment OU. -  PACHY done previously showed -  VF done previously OD shows Superior arcuate -  IOP today OD 14-  Cup to Disc noted at OD . 9-  Continue Latanoprost QHS OD  -  Continue to monitor  -  RTC in 6 months with VF 24-2 and OCT (OD ONLY)Type II DM dx 25+ years ago:  -  Discussed diagnosis in detail with patient  -  Patient is followed by Dr. Elda Bautista as well but has since been released from his care for injections  -  No DME seen on exam today doing much better. -  Mild BDR seen on last visit but stable from previous. -  Explained importance of good blood sugar control and how it can affect the vision. -  Reminded again of importance of good blood sugar control. -  Recommend no sodas -  Continue to monitor P/C IOL OD:-  Educated patient on condition today. -  Appears stable and doing well. -  Continue to monitor.  Band Keratopathy OS -  Discussed diagnosis in detail with patient-  Patient is stable at this time-  Continue to monitorHx of Keratitis OD- Discussed diagnosis in detail with patient- No findings on exam today- Continue to monitorHx of Hypotony OS- Discussed diagnosis in detail with patient- Signs/symptoms associated discussed- Continue to monitorPresbyopia OU - Discussed diagnosis in detail with patient- Continue to monitor PRN ""; 's Notes: MR  6/13/2018Optos 6/13/2018OCT disc 3/28/19OCT mac 1/30/18VF 7/15/19Gonio  6/14/18PACH  10/20/15 - Nicolas"

## 2022-04-09 ASSESSMENT — PACHYMETRY
OD_CT_UM: 605; ADJ: THICK

## 2022-04-09 ASSESSMENT — VISUAL ACUITY
OD_CC: 20/40-2
OD_PH: 20/40
OD_SC: 20/25-
OS_CC: 20/40+1
OD_CC: 20/40-2
OD_CC: 20/40
OD_CC: 20/63+2
OD_PH: 20/25
OS_CC: 20/100+2
OD_SC: 20/30-
OD_PH: 20/25
OD_SC: 20/50-2
OD_SC: 20/50+
OS_CC: 20/25-
OD_CC: 20/29+
OD_CC: 20/30-2
OD_CC: 20/25-

## 2022-04-09 ASSESSMENT — TONOMETRY
OD_IOP_MMHG: 13
OD_IOP_MMHG: 12
OD_IOP_MMHG: 14
OD_IOP_MMHG: 15
OD_IOP_MMHG: 15
OD_IOP_MMHG: 11

## 2022-11-16 ENCOUNTER — EMERGENCY VISIT (OUTPATIENT)
Dept: URBAN - NONMETROPOLITAN AREA CLINIC 1 | Facility: CLINIC | Age: 72
End: 2022-11-16

## 2022-11-16 DIAGNOSIS — H16.223: ICD-10-CM

## 2022-11-16 PROCEDURE — 99213 OFFICE O/P EST LOW 20 MIN: CPT

## 2022-11-16 RX ORDER — KETOROLAC TROMETHAMINE 5 MG/ML: 1 SOLUTION OPHTHALMIC

## 2022-11-16 ASSESSMENT — TONOMETRY: OD_IOP_MMHG: 21

## 2022-11-16 ASSESSMENT — VISUAL ACUITY: OD_SC: 20/32

## 2022-11-16 NOTE — PATIENT DISCUSSION
"4 week S/P Ptosis Repair OD - Discussed diagnosis in detail with patient - Patient is stable and doing well - Continue to monitor - RTC for complete PREVIOUS NOTES ENRIKE OU- Discussed diagnosis in detail with patient- Discussed signs and symptoms of progression- Recommend patient drinking plenty of water and starting Omega 3’s - Recommend Refresh Relieva through out the day - Samples given today of Refresh Relieva- Continue to monitor-------------------------previous notes----------------------------POAG w/ hx of narrow angles:-  Discussed diagnosis in detail with patient  -  Patient had iStent placed at time of cataract surgery. -  Patent PI noted today. -  Steroid reponder previously. -  OCT done previously shows No NFL thinning OD and OS UTO -  Optos done previously stable findings OU. -  Gonio done previously grade IV with light pigment OU. -  PACHY done previously showed -  VF done previously OD shows Superior arcuate -  IOP today OD 14-  Cup to Disc noted at OD . 9-  Continue Latanoprost QHS OD  -  Continue to monitor  -  RTC in 6 months with VF 24-2 and OCT (OD ONLY)Type II DM dx 25+ years ago:  -  Discussed diagnosis in detail with patient  -  Patient is followed by Dr. Yoan Rios as well but has since been released from his care for injections  -  No DME seen on exam today doing much better. -  Mild BDR seen on last visit but stable from previous. -  Explained importance of good blood sugar control and how it can affect the vision. -  Reminded again of importance of good blood sugar control. -  Recommend no sodas -  Continue to monitor P/C IOL OD:-  Educated patient on condition today. -  Appears stable and doing well. -  Continue to monitor. Band Keratopathy OS -  Discussed diagnosis in detail with patient-  Patient is stable at this time-  Continue to monitorHx of Keratitis OD- Discussed diagnosis in detail with patient- No findings on exam today- Continue to monitorHx of Hypotony OS- Discussed diagnosis in detail with patient- Signs/symptoms associated discussed- Continue to monitorPresbyopia OU - Discussed diagnosis in detail with patient- Continue to monitor PRN ""; 's Notes: MR  6/13/2018Optos 6/13/2018OCT disc 3/28/19OCT mac 1/30/18VF 7/15/19Gonio  6/14/18PACH  10/20/15 - Nicloas. "

## 2022-11-16 NOTE — PATIENT DISCUSSION
Explained to the patient that if the left eye continues to irritate him and/or cause him pain we may have to consider referral to have encleated. Patient agreed, will continue to monitor.

## 2023-07-27 ENCOUNTER — EMERGENCY VISIT (OUTPATIENT)
Dept: URBAN - NONMETROPOLITAN AREA CLINIC 1 | Facility: CLINIC | Age: 73
End: 2023-07-27

## 2023-07-27 DIAGNOSIS — T15.11XA: ICD-10-CM

## 2023-07-27 PROCEDURE — 65205 REMOVE FOREIGN BODY FROM EYE: CPT

## 2023-07-27 PROCEDURE — 99213 OFFICE O/P EST LOW 20 MIN: CPT

## 2024-03-27 ENCOUNTER — FOLLOW UP (OUTPATIENT)
Dept: URBAN - NONMETROPOLITAN AREA CLINIC 1 | Facility: CLINIC | Age: 74
End: 2024-03-27

## 2024-03-27 DIAGNOSIS — H40.1132: ICD-10-CM

## 2024-03-27 DIAGNOSIS — H16.223: ICD-10-CM

## 2024-03-27 DIAGNOSIS — E11.9: ICD-10-CM

## 2024-03-27 DIAGNOSIS — Z79.4: ICD-10-CM

## 2024-03-27 PROCEDURE — 99213 OFFICE O/P EST LOW 20 MIN: CPT

## 2024-03-27 PROCEDURE — 92250 FUNDUS PHOTOGRAPHY W/I&R: CPT

## 2024-03-27 ASSESSMENT — TONOMETRY
OD_IOP_MMHG: 13
OS_IOP_MMHG: 13

## 2024-03-27 ASSESSMENT — VISUAL ACUITY: OD_CC: 20/25-2

## 2025-04-10 ENCOUNTER — FOLLOW UP (OUTPATIENT)
Age: 75
End: 2025-04-10

## 2025-04-10 DIAGNOSIS — H52.4: ICD-10-CM

## 2025-04-10 DIAGNOSIS — Z79.4: ICD-10-CM

## 2025-04-10 DIAGNOSIS — H40.1132: ICD-10-CM

## 2025-04-10 DIAGNOSIS — E11.9: ICD-10-CM

## 2025-04-10 PROCEDURE — 92133 CPTRZD OPH DX IMG PST SGM ON: CPT

## 2025-04-10 PROCEDURE — 99214 OFFICE O/P EST MOD 30 MIN: CPT

## 2025-04-10 PROCEDURE — 92015 DETERMINE REFRACTIVE STATE: CPT
